# Patient Record
Sex: FEMALE | Race: WHITE | NOT HISPANIC OR LATINO | Employment: PART TIME | URBAN - NONMETROPOLITAN AREA
[De-identification: names, ages, dates, MRNs, and addresses within clinical notes are randomized per-mention and may not be internally consistent; named-entity substitution may affect disease eponyms.]

---
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
## 2023-02-04 ENCOUNTER — HOSPITAL ENCOUNTER (EMERGENCY)
Facility: HOSPITAL | Age: 18
Discharge: HOME | End: 2023-02-04
Attending: EMERGENCY MEDICINE

## 2023-02-04 VITALS
SYSTOLIC BLOOD PRESSURE: 141 MMHG | RESPIRATION RATE: 18 BRPM | TEMPERATURE: 97.7 F | BODY MASS INDEX: 34.17 KG/M2 | HEART RATE: 112 BPM | DIASTOLIC BLOOD PRESSURE: 85 MMHG | OXYGEN SATURATION: 99 % | WEIGHT: 199.08 LBS

## 2023-02-04 DIAGNOSIS — N93.9 ABNORMAL UTERINE BLEEDING: ICD-10-CM

## 2023-02-04 DIAGNOSIS — N93.9 VAGINAL BLEEDING: Primary | ICD-10-CM

## 2023-02-04 LAB
ABO GROUP BLD: NORMAL
ABO GROUP BLD: NORMAL
ALBUMIN SERPL BCP-MCNC: 4.4 G/DL (ref 3.5–5)
ALP SERPL-CCNC: 63 U/L (ref 34–104)
ALT SERPL W P-5'-P-CCNC: 148 U/L (ref 7–52)
ANION GAP SERPL CALCULATED.3IONS-SCNC: 11 MMOL/L (ref 4–13)
AST SERPL W P-5'-P-CCNC: 76 U/L (ref 13–39)
B-HCG SERPL-ACNC: <1 MIU/ML (ref 0–11.6)
BASOPHILS # BLD AUTO: 0.05 THOUSANDS/ÂΜL (ref 0–0.1)
BASOPHILS NFR BLD AUTO: 1 % (ref 0–1)
BILIRUB SERPL-MCNC: 0.4 MG/DL (ref 0.2–1)
BLD GP AB SCN SERPL QL: NEGATIVE
BUN SERPL-MCNC: 9 MG/DL (ref 5–25)
CALCIUM SERPL-MCNC: 9.1 MG/DL (ref 8.4–10.2)
CHLORIDE SERPL-SCNC: 102 MMOL/L (ref 96–108)
CO2 SERPL-SCNC: 26 MMOL/L (ref 21–32)
CREAT SERPL-MCNC: 0.7 MG/DL (ref 0.6–1.3)
EOSINOPHIL # BLD AUTO: 0.31 THOUSAND/ÂΜL (ref 0–0.61)
EOSINOPHIL NFR BLD AUTO: 4 % (ref 0–6)
ERYTHROCYTE [DISTWIDTH] IN BLOOD BY AUTOMATED COUNT: 12.4 % (ref 11.6–15.1)
EXT PREGNANCY TEST URINE: NEGATIVE
EXT. CONTROL: NORMAL
GFR SERPL CREATININE-BSD FRML MDRD: 126 ML/MIN/1.73SQ M
GLUCOSE SERPL-MCNC: 167 MG/DL (ref 65–140)
HCT VFR BLD AUTO: 41.8 % (ref 34.8–46.1)
HGB BLD-MCNC: 14.3 G/DL (ref 11.5–15.4)
IMM GRANULOCYTES # BLD AUTO: 0.05 THOUSAND/UL (ref 0–0.2)
IMM GRANULOCYTES NFR BLD AUTO: 1 % (ref 0–2)
LYMPHOCYTES # BLD AUTO: 2.81 THOUSANDS/ÂΜL (ref 0.6–4.47)
LYMPHOCYTES NFR BLD AUTO: 38 % (ref 14–44)
MCH RBC QN AUTO: 29.2 PG (ref 26.8–34.3)
MCHC RBC AUTO-ENTMCNC: 34.2 G/DL (ref 31.4–37.4)
MCV RBC AUTO: 85 FL (ref 82–98)
MONOCYTES # BLD AUTO: 0.46 THOUSAND/ÂΜL (ref 0.17–1.22)
MONOCYTES NFR BLD AUTO: 6 % (ref 4–12)
NEUTROPHILS # BLD AUTO: 3.78 THOUSANDS/ÂΜL (ref 1.85–7.62)
NEUTS SEG NFR BLD AUTO: 50 % (ref 43–75)
NRBC BLD AUTO-RTO: 0 /100 WBCS
PLATELET # BLD AUTO: 288 THOUSANDS/UL (ref 149–390)
PMV BLD AUTO: 9 FL (ref 8.9–12.7)
POTASSIUM SERPL-SCNC: 3.6 MMOL/L (ref 3.5–5.3)
PROT SERPL-MCNC: 7 G/DL (ref 6.4–8.4)
RBC # BLD AUTO: 4.9 MILLION/UL (ref 3.81–5.12)
RH BLD: POSITIVE
RH BLD: POSITIVE
SODIUM SERPL-SCNC: 139 MMOL/L (ref 135–147)
SPECIMEN EXPIRATION DATE: NORMAL
WBC # BLD AUTO: 7.46 THOUSAND/UL (ref 4.31–10.16)

## 2023-02-04 NOTE — ED PROVIDER NOTES
History  Chief Complaint   Patient presents with   • Vaginal Bleeding     Bleeding for 2 weeks heavy  Lightheaded also     21year old female presents for vaginal bleeding  No abdominal pain or cramping  Bleeding described as heavy stating that she is soaking through extra absorbent tampons and pads  Patient noticed large clots as well being passed  Patient states that she does feel lightheaded from time to time  She has been eating and drinking without difficulty, although she only has 1 meal per day  Per [facility] OB[facility]GYN note from 1/25, diagnosed with Irregular menses (Primary) and Dysmenorrhea  Only therapy recommendation in the record is for symptom management with ibuprofen  Patient stopped taking OCPs in August 2022  Currently trying to get pregnant  On exam, the patient is very well-appearing, no distress  Noted that she ambulated to the exam room without any difficulty  None       Past Medical History:   Diagnosis Date   • Tonsillitis        Past Surgical History:   Procedure Laterality Date   • TONSILLECTOMY  03/16/2022   • TYMPANOSTOMY TUBE PLACEMENT         History reviewed  No pertinent family history  I have reviewed and agree with the history as documented  E-Cigarette/Vaping   • E-Cigarette Use Never User      E-Cigarette/Vaping Substances     Social History     Tobacco Use   • Smoking status: Never   • Smokeless tobacco: Never   Vaping Use   • Vaping Use: Never used   Substance Use Topics   • Alcohol use: Not Currently   • Drug use: Never       Review of Systems   Constitutional: Negative for activity change, appetite change, chills, diaphoresis, fatigue and fever  HENT: Negative for dental problem, ear pain, sore throat, trouble swallowing and voice change  Eyes: Negative for pain and visual disturbance  Respiratory: Negative for cough, chest tightness, shortness of breath and wheezing  Cardiovascular: Negative for chest pain, palpitations and leg swelling  Gastrointestinal: Negative for abdominal pain, anal bleeding, blood in stool, diarrhea, nausea, rectal pain and vomiting  Endocrine: Negative for polydipsia, polyphagia and polyuria  Genitourinary: Positive for vaginal bleeding  Negative for difficulty urinating, dysuria, flank pain, frequency, hematuria, pelvic pain, urgency, vaginal discharge and vaginal pain  Musculoskeletal: Negative for back pain, joint swelling, myalgias, neck pain and neck stiffness  Skin: Negative for pallor, rash and wound  Neurological: Negative for dizziness, facial asymmetry, speech difficulty, weakness, light-headedness, numbness and headaches  Hematological: Negative for adenopathy  Psychiatric/Behavioral: Negative for agitation  The patient is not nervous/anxious  All other systems reviewed and are negative  Physical Exam  Physical Exam  Vitals and nursing note reviewed  Constitutional:       General: She is not in acute distress  Appearance: She is well-developed  She is not diaphoretic  HENT:      Head: Normocephalic and atraumatic  Right Ear: External ear normal       Left Ear: External ear normal       Nose: Nose normal  No congestion or rhinorrhea  Mouth/Throat:      Mouth: Mucous membranes are moist    Eyes:      General: No visual field deficit or scleral icterus  Right eye: No discharge  Left eye: No discharge  Conjunctiva/sclera: Conjunctivae normal       Pupils: Pupils are equal, round, and reactive to light  Neck:      Thyroid: No thyromegaly  Vascular: No JVD  Trachea: No tracheal deviation  Cardiovascular:      Rate and Rhythm: Normal rate and regular rhythm  Pulses: Normal pulses  Heart sounds: Normal heart sounds, S1 normal and S2 normal  No murmur heard  No friction rub  No gallop  Pulmonary:      Effort: Pulmonary effort is normal  No accessory muscle usage, respiratory distress or retractions        Breath sounds: Normal breath sounds  No stridor or decreased air movement  No decreased breath sounds, wheezing, rhonchi or rales  Chest:      Chest wall: No tenderness  Abdominal:      General: There is no distension  Palpations: Abdomen is soft  There is no mass  Tenderness: There is no abdominal tenderness  There is no guarding or rebound  Hernia: No hernia is present  Musculoskeletal:         General: No tenderness or deformity  Normal range of motion  Cervical back: Normal range of motion and neck supple  Right lower leg: No edema  Left lower leg: No edema  Lymphadenopathy:      Cervical: No cervical adenopathy  Skin:     General: Skin is warm and dry  Capillary Refill: Capillary refill takes less than 2 seconds  Coloration: Skin is not mottled or pale  Findings: No bruising, erythema, petechiae or rash  Neurological:      General: No focal deficit present  Mental Status: She is alert and oriented to person, place, and time  GCS: GCS eye subscore is 4  GCS verbal subscore is 5  GCS motor subscore is 6  Cranial Nerves: Cranial nerves 2-12 are intact  No cranial nerve deficit, dysarthria or facial asymmetry  Sensory: Sensation is intact  No sensory deficit  Motor: Motor function is intact  No weakness or tremor  Coordination: Coordination is intact  Gait: Gait is intact  Deep Tendon Reflexes: Reflexes are normal and symmetric   Reflexes normal    Psychiatric:         Behavior: Behavior normal          Vital Signs  ED Triage Vitals [02/04/23 1034]   Temperature Pulse Respirations Blood Pressure SpO2   97 7 °F (36 5 °C) (!) 112 18 141/85 99 %      Temp Source Heart Rate Source Patient Position - Orthostatic VS BP Location FiO2 (%)   Temporal Monitor Sitting Right arm --      Pain Score       2           Vitals:    02/04/23 1034   BP: 141/85   Pulse: (!) 112   Patient Position - Orthostatic VS: Sitting         Visual Acuity      ED Medications  Medications - No data to display    Diagnostic Studies  Results Reviewed     Procedure Component Value Units Date/Time    Quantitative hCG [[id number]]  (Normal) Collected: 02/04/23 1047    Lab Status: Final result Specimen: Blood from Arm, Right Updated: 02/04/23 1117     HCG, Quant <1 mIU/mL     Narrative:       Expected Ranges:     Approximate               Approximate HCG  Gestation age          Concentration ( mIU/mL)  _____________          ______________________   Faye Pimple                      HCG values  0 2-1                       5-50  1-2                           2-3                         100-5000  3-4                         500-10982  4-5                         1000-05075  5-6                         [id number]  6-8                         44827-777484  8-12                        45209-030374      Comprehensive metabolic panel [[id number]]  (Abnormal) Collected: 02/04/23 1047    Lab Status: Final result Specimen: Blood from Arm, Right Updated: 02/04/23 1110     Sodium 139 mmol/L      Potassium 3 6 mmol/L      Chloride 102 mmol/L      CO2 26 mmol/L      ANION GAP 11 mmol/L      BUN 9 mg/dL      Creatinine 0 70 mg/dL      Glucose 167 mg/dL      Calcium 9 1 mg/dL      AST 76 U/L       U/L      Alkaline Phosphatase 63 U/L      Total Protein 7 0 g/dL      Albumin 4 4 g/dL      Total Bilirubin 0 40 mg/dL      eGFR 126 ml/min/1 73sq m     Narrative:      [place] guidelines for Chronic Kidney Disease (CKD):   •  Stage 1 with normal or high GFR (GFR > 90 mL/min/1 73 square meters)  •  Stage 2 Mild CKD (GFR = 60-89 mL/min/1 73 square meters)  •  Stage 3A Moderate CKD (GFR = 45-59 mL/min/1 73 square meters)  •  Stage 3B Moderate CKD (GFR = 30-44 mL/min/1 73 square meters)  •  Stage 4 Severe CKD (GFR = 15-29 mL/min/1 73 square meters)  •  Stage 5 End Stage CKD (GFR <15 mL/min/1 73 square meters)  Note: GFR calculation is accurate only with a steady state creatinine    CBC and differential [[id number]] Collected: 02/04/23 1047    Lab Status: Final result Specimen: Blood from Arm, Right Updated: 02/04/23 1055     WBC 7 46 Thousand/uL      RBC 4 90 Million/uL      Hemoglobin 14 3 g/dL      Hematocrit 41 8 %      MCV 85 fL      MCH 29 2 pg      MCHC 34 2 g/dL      RDW 12 4 %      MPV 9 0 fL      Platelets 652 Thousands/uL      nRBC 0 /100 WBCs      Neutrophils Relative 50 %      Immat GRANS % 1 %      Lymphocytes Relative 38 %      Monocytes Relative 6 %      Eosinophils Relative 4 %      Basophils Relative 1 %      Neutrophils Absolute 3 78 Thousands/µL      Immature Grans Absolute 0 05 Thousand/uL      Lymphocytes Absolute 2 81 Thousands/µL      Monocytes Absolute 0 46 Thousand/µL      Eosinophils Absolute 0 31 Thousand/µL      Basophils Absolute 0 05 Thousands/µL     POCT pregnancy, urine [[id number]]  (Normal) Resulted: 02/04/23 1049    Lab Status: Final result Updated: 02/04/23 1049     EXT Preg Test, Ur Negative     Control Valid                 No orders to display              Procedures  Procedures         ED Course         1133 hrs : Patient reassessed  Vital signs within normal limits- tachycardia resolved, HR 89  Reviewed lab work and plan of action with the patient  Discussed use of hormonal therapy  Patient does not want to consider hormonal therapy at this time as she is trying to get pregnant  Provided patient with gynecology follow-up at [facility] as alternative  Medical Decision Making  21year old female presents for vaginal bleeding  No abdominal pain or cramping  Bleeding described as heavy stating that she is soaking through extra absorbent tampons and pads  Patient noticed large clots as well being passed  Patient states that she does feel lightheaded from time to time  She has been eating and drinking without difficulty, although she only has 1 meal per day    Per [facility] OB/GYN note from 1/25, diagnosed with Irregular menses (Primary) and Dysmenorrhea  Only therapy recommendation in the record is for symptom management with ibuprofen  Patient stopped taking OCPs in August 2022  Currently trying to get pregnant  On exam, the patient is very well-appearing, no distress  Noted that she ambulated to the exam room without any difficulty  Will check basic labs, provide symptom management, pelvic exam, consider blood transfusion as needed, gynecology consult, disposition as appropriate  Amount and/or Complexity of Data Reviewed  External Data Reviewed: labs  Labs: ordered  Care Everywhere Labs History  Expand All  Collapse All  CBC  [facility] [place]  01/25/2023  Component      Hemoglobin   Hematocrit   WBC   RBC   Platelet Count   MPV   MCV   MCH   MCHC   RDW     Component 01/25/2023 03/25/2022 03/24/2022 03/11/2022 01/07/2021 12/08/2020 11/07/2020             Hemoglobin 14 6 High     14 1 14 7 High     14 5 14 3 13 8 13 7   Hematocrit 43 0 40 9 43 4 High     42 3 42 4 41 4 40 1   WBC 6 7 7 8 7 6 6 0 9 6 9 6 9 3   RBC 5 20 High     5 04 High     5 30 High     5 20 High     5 04 High     4 78 4 72   Platelet Count 309 315 325 282 326 304 295   MPV 7 8 6 4 Low     6 5 Low     7 2 Low     7 5 7 9 7 7   MCV 83 81 82 81 84 87 85   MCH 28 2 28 0 27 7 27 9 28 3 28 9 29 0   MCHC 34 0 34 6 33 8 34 3 33 7 33 4 34 2   RDW 13 5 13 8 13 6 14 4            Disposition  Final diagnoses:   Vaginal bleeding   Abnormal uterine bleeding     Time reflects when diagnosis was documented in both MDM as applicable and the Disposition within this note     Time User Action Codes Description Comment    2/4/2023 11:25 AM Shasha Linden Add [N93 9] Vaginal bleeding     2/4/2023 11:25 AM Shasha Linden Add [N93 9] Abnormal uterine bleeding       ED Disposition     ED Disposition   Discharge    Condition   Stable    Date/Time   Sat Feb 4, 2023 11:25 AM    Comment   Dami Owusu discharge to home/self care                 Follow-up Information Follow up With Specialties Details Why Contact Info Additional [address] [facility] OB/GYN Obstetrics and Gynecology Call  Follow up [address] [address]  [address], [address], [address], [address]   [facility] [place] Obstetrics and Gynecology Call  Follow up [address] [address]  [address] [facility], [address] [place] [address], [address]          Patient's Medications    No medications on file       No discharge procedures on file      PDMP Review     None          ED Provider  Electronically Signed by           Shubham Pena,   02/04/23 2025 [place] Naima, DO  02/04/23 1214

## 2023-02-04 NOTE — Clinical Note
Renetta  was seen and treated in our emergency department on 2/4/2023  Diagnosis:     Syl Caldwell  may return to work on return date  She may return on this date: 02/07/2023         If you have any questions or concerns, please don't hesitate to call        Martha Fox DO    ______________________________           _______________          _______________  Hospital Representative                              Date                                Time

## 2023-08-21 ENCOUNTER — OFFICE VISIT (OUTPATIENT)
Dept: URGENT CARE | Facility: CLINIC | Age: 18
End: 2023-08-21
Payer: COMMERCIAL

## 2023-08-21 VITALS
RESPIRATION RATE: 18 BRPM | TEMPERATURE: 100.4 F | BODY MASS INDEX: 35.34 KG/M2 | HEART RATE: 87 BPM | OXYGEN SATURATION: 96 % | SYSTOLIC BLOOD PRESSURE: 129 MMHG | WEIGHT: 207 LBS | HEIGHT: 64 IN | DIASTOLIC BLOOD PRESSURE: 80 MMHG

## 2023-08-21 DIAGNOSIS — R10.2 VAGINAL PAIN: ICD-10-CM

## 2023-08-21 DIAGNOSIS — R09.81 NASAL CONGESTION: ICD-10-CM

## 2023-08-21 DIAGNOSIS — R11.0 NAUSEA: ICD-10-CM

## 2023-08-21 DIAGNOSIS — B34.9 VIRAL SYNDROME: Primary | ICD-10-CM

## 2023-08-21 PROCEDURE — G0382 LEV 3 HOSP TYPE B ED VISIT: HCPCS

## 2023-08-21 PROCEDURE — 99283 EMERGENCY DEPT VISIT LOW MDM: CPT

## 2023-08-21 RX ORDER — VENLAFAXINE HYDROCHLORIDE 75 MG/1
75 CAPSULE, EXTENDED RELEASE ORAL DAILY
COMMUNITY
Start: 2023-08-01 | End: 2024-07-31

## 2023-08-21 RX ORDER — PROPRANOLOL HYDROCHLORIDE 80 MG/1
80 TABLET ORAL 2 TIMES DAILY
COMMUNITY
Start: 2023-07-12

## 2023-08-21 RX ORDER — FLUTICASONE PROPIONATE 50 MCG
2 SPRAY, SUSPENSION (ML) NASAL DAILY
Qty: 11.1 ML | Refills: 0 | Status: SHIPPED | OUTPATIENT
Start: 2023-08-21

## 2023-08-21 RX ORDER — OMEPRAZOLE 40 MG/1
40 CAPSULE, DELAYED RELEASE ORAL DAILY
COMMUNITY
Start: 2023-06-22 | End: 2024-06-21

## 2023-08-21 RX ORDER — QUETIAPINE FUMARATE 50 MG/1
50 TABLET, FILM COATED ORAL EVERY EVENING
COMMUNITY
Start: 2023-08-02

## 2023-08-21 NOTE — PATIENT INSTRUCTIONS
Take prescribed nasal spray as instructed. Tylenol for pain or fever. Make sure to stay well-hydrated and rest.  Daily multivitamin. Zinc.  Vitamin D3 2000 IU daily. Vitamin C 1000 mg twice daily for immune support. May do nasal saline rinses twice daily. Continue with at home previously prescribed Zofran for nausea. Follow-up with your OB/GYN with regards to your vaginal pain. If any symptoms worsen go to the emergency room. If no improvement, follow-up with your family doctor. Follow up with PCP in 3-5 days. Proceed to  ER if symptoms worsen. Acute Nausea and Vomiting   WHAT YOU NEED TO KNOW:   Acute means the nausea and vomiting starts suddenly, gets worse quickly, and lasts a short time. There are many possible causes of acute nausea and vomiting. DISCHARGE INSTRUCTIONS:   Call your local emergency number (915 in the [address]) if:   You have chest pain. You have severe pain or cramping in your abdomen. Your vision is blurred. You are confused, have a high fever, or a stiff neck. You have bright red blood coming from your rectum. Your vomit smells like bowel movement. Return to the emergency department if:   You have a severe headache or pain. You are dizzy, cold, and thirsty, and your eyes and mouth are dry. You are urinating very little or not at all. You are dizzy or lightheaded when you stand up. You see blood or material that looks like coffee grounds in your vomit. Call your doctor if:   You continue to vomit for more than 48 hours. Your nausea and vomiting does not get better or go away after you use medicine. You have questions or concerns about your condition or care. Medicines: You may need any of the following:  Medicines  may be given to calm your stomach and stop your vomiting. You may also need medicines to help empty your stomach and bowels. Take your medicine as directed.   Contact your healthcare provider if you think your medicine is not helping or if you have side effects. Tell your provider if you are allergic to any medicine. Keep a list of the medicines, vitamins, and herbs you take. Include the amounts, and when and why you take them. Bring the list or the pill bottles to follow-up visits. Carry your medicine list with you in case of an emergency. Manage your symptoms:   Rest as much as you can. Too much activity can make your nausea worse. Drink more liquids to prevent dehydration. Take small sips. Try drinks such as ginger ale, lemonade, water, or tea. Your provider may recommend that you drink an oral rehydration solution (ORS). ORS contains water, salts, and sugar that are needed to replace the lost body fluids. Eat smaller meals, more often. Try bland foods and avoid spices or strong flavors    Do not drink alcohol. Alcohol may upset or irritate your stomach. Follow up with your doctor as directed:  Write down your questions so you remember to ask them during your visits. © Copyright [employer] 2022 Information is for End User's use only and may not be sold, redistributed or otherwise used for commercial purposes. The above information is an  only. It is not intended as medical advice for individual conditions or treatments. Talk to your doctor, nurse or pharmacist before following any medical regimen to see if it is safe and effective for you. Viral Syndrome   WHAT YOU NEED TO KNOW:   Viral syndrome is a term used for symptoms of an infection caused by a virus. Viruses are spread easily from person to person on shared items. DISCHARGE INSTRUCTIONS:   Call your local emergency number (911 in the [place]), or have someone call if:   You have a seizure. You cannot be woken. You have chest pain or trouble breathing. Return to the emergency department if:   You have a stiff neck, a bad headache, and sensitivity to light. You feel weak, dizzy, or confused.     You stop urinating or urinate a lot less than usual.    You cough up blood or thick yellow or green mucus. You have severe abdominal pain or your abdomen is larger than usual.    Call your doctor if:   Your symptoms do not get better with treatment or get worse after 3 days. You have a rash or ear pain. You have burning when you urinate. You have questions or concerns about your condition or care. Medicines: You may  need any of the following:  Acetaminophen  decreases pain and fever. It is available without a doctor's order. Ask how much to take and how often to take it. Follow directions. Read the labels of all other medicines you are using to see if they also contain acetaminophen, or ask your doctor or pharmacist. Acetaminophen can cause liver damage if not taken correctly. NSAIDs , such as ibuprofen, help decrease swelling, pain, and fever. NSAIDs can cause stomach bleeding or kidney problems in certain people. If you take blood thinner medicine, always ask your healthcare provider if NSAIDs are safe for you. Always read the medicine label and follow directions. Cold medicine  helps decrease swelling, control a cough, and relieve chest or nasal congestion. Saline nasal spray  helps decrease nasal congestion. Take your medicine as directed. Contact your healthcare provider if you think your medicine is not helping or if you have side effects. Tell your provider if you are allergic to any medicine. Keep a list of the medicines, vitamins, and herbs you take. Include the amounts, and when and why you take them. Bring the list or the pill bottles to follow-up visits. Carry your medicine list with you in case of an emergency. Manage your symptoms:   Drink liquids as directed to prevent dehydration. Ask how much liquid to drink each day and which liquids are best for you. Do not drink liquids with caffeine. Caffeine can make dehydration worse. Get plenty of rest to help your body heal.  Take naps throughout the day.  Ask your healthcare provider when you can return to work and your normal activities. Use a cool mist humidifier  to increase air moisture in your home. This may make it easier for you to breathe and help decrease your cough. Drink tea with honey or use cough drops for a sore throat. Cough drops are available without a doctor's order. Follow directions for taking cough drops. Do not smoke or be close to anyone who is smoking. Nicotine and other chemicals in cigarettes and cigars can cause lung damage. Smoking can also delay healing. Ask your healthcare provider for information if you currently smoke and need help to quit. E-cigarettes or smokeless tobacco still contain nicotine. Talk to your healthcare provider before you use these products. Prevent the spread of germs:   Wash your hands often throughout the day. Use soap and water. Rub your soapy hands together, lacing your fingers, for at least 20 seconds. Rinse with warm, running water. Dry your hands with a clean towel or paper towel. Use hand  that contains alcohol if soap and water are not available. Teach children how to wash their hands and use hand . Cover sneezes and coughs. Turn your face away and cover your mouth and nose with a tissue. Throw the tissue away. Use the bend of your arm if a tissue is not available. Then wash your hands well with soap and water or use hand . Teach children how to cover a cough or sneeze. Stay home while you are sick. Avoid crowds as much as possible. Get the influenza (flu) vaccine as soon as recommended each year. The flu vaccine is available starting in September or October. Ask your healthcare provider about the pneumonia vaccine. This vaccine is usually recommended every 5 years in older adults. Follow up with your doctor as directed:  Write down your questions so you remember to ask them during your visits.   © Copyright [employer] 2022 Information is for End User's use only and may not be sold, redistributed or otherwise used for commercial purposes. The above information is an  only. It is not intended as medical advice for individual conditions or treatments. Talk to your doctor, nurse or pharmacist before following any medical regimen to see if it is safe and effective for you.

## 2023-08-21 NOTE — PROGRESS NOTES
[facility]        NAME: Brian Escamilla is a 19 y.o. female  : 2005    MRN: [id number]  DATE: 2023  TIME: 5:45 PM    Assessment and Plan   Viral syndrome [B34.9]  1. Viral syndrome        2. Vaginal pain        3. Nasal congestion  fluticasone (FLONASE) 50 mcg/act nasal spray      4. Nausea          Symptoms started today-nausea without vomiting, headache, body aches, sore throat, fever, vaginal pain. Denies chance of pregnancy-negative test today at home. Negative COVID test at home today. Slight temperature of 100.4 F. Vaginal pain started today as well, history of PCOS. No discharge or urinary symptoms. Recent follow-up with OB/GYN a week ago. Advised to follow-up with OB/GYN for current symptoms. Advised to go to the ER symptoms worsen. Advised follow-up with family doctor. Patient Instructions     Take prescribed nasal spray as instructed. Tylenol for pain or fever. Make sure to stay well-hydrated and rest.  Daily multivitamin. Zinc.  Vitamin D3 2000 IU daily. Vitamin C 1000 mg twice daily for immune support. May do nasal saline rinses twice daily. Continue with at home previously prescribed Zofran for nausea. Follow-up with your OB/GYN with regards to your vaginal pain. If any symptoms worsen go to the emergency room. If no improvement, follow-up with your family doctor. Follow up with PCP in 3-5 days. Proceed to  ER if symptoms worsen. Chief Complaint     Chief Complaint   Patient presents with   • Headache   • Nausea   • Generalized Body Aches   • Sore Throat   • Fever     Did take an at home covid and was neg. Fever is 100.4 Symptoms started this morning. 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 She did take a pregnacy test was negative. Vaginal pain also just started this morning.     • Vaginal Pain         History of Present Illness       64-year-old female here with significant other for nausea, headache, body aches, sore throat, fever, and vaginal pain that began today. PT had a negative home COVID test and negative home pregnancy test today. PT states that her and her partner are actively trying to have a child. PT has Zofran at home that was previously prescribed, has not taken it today. Admits to runny nose/congestion. Has not tried any over-the-counter medications today. PT states that she has history of PCOS-denies any recent UTI symptoms, discharge, bleeding. Last normal menstrual period was about 3 weeks ago per patient. Denies any chills, earache, chest pain, short breathing, abdominal pain, vomiting, diarrhea, constipation. Review of Systems   Review of Systems   Constitutional: Positive for fever. HENT: Positive for congestion, rhinorrhea and sore throat. Negative for ear discharge and ear pain. Eyes: Negative. Respiratory: Negative. Cardiovascular: Negative. Gastrointestinal: Positive for nausea. Negative for abdominal distention, abdominal pain, blood in stool, constipation, diarrhea, rectal pain and vomiting. Genitourinary: Positive for vaginal pain. Negative for decreased urine volume, difficulty urinating, flank pain, frequency, hematuria, urgency, vaginal bleeding and vaginal discharge. Musculoskeletal: Positive for myalgias. Skin: Negative. Neurological: Negative.           Current Medications       Current Outpatient Medications:   •  fluticasone (FLONASE) 50 mcg/act nasal spray, 2 sprays into each nostril daily, Disp: 11.1 mL, Rfl: 0  •  metFORMIN (GLUCOPHAGE) 500 mg tablet, Take 500 mg by mouth 2 (two) times a day with meals, Disp: , Rfl:   •  omeprazole (PriLOSEC) 40 MG capsule, Take 40 mg by mouth daily, Disp: , Rfl:   •  propranolol (INDERAL) 80 mg tablet, Take 80 mg by mouth 2 (two) times a day, Disp: , Rfl:   •  QUEtiapine (SEROquel) 50 mg tablet, Take 50 mg by mouth every evening, Disp: , Rfl:   •  venlafaxine (EFFEXOR-XR) 75 mg 24 hr capsule, Take 75 mg by mouth daily, Disp: , Rfl: Current Allergies     Allergies as of 08/21/2023   • (No Known Allergies)            The following portions of the patient's history were reviewed and updated as appropriate: allergies, current medications, past family history, past medical history, past social history, past surgical history and problem list.     Past Medical History:   Diagnosis Date   • Diabetes mellitus ([address])    • Tonsillitis        Past Surgical History:   Procedure Laterality Date   • TONSILLECTOMY  03/16/2022   • TYMPANOSTOMY TUBE PLACEMENT         History reviewed. No pertinent family history. Medications have been verified. Objective   /80   Pulse 87   Temp 100.4 °F (38 °C)   Resp 18   Ht 5' 4" (1.626 m)   Wt 93.9 kg (207 lb)   SpO2 96%   BMI 35.53 kg/m²        Physical Exam     Physical Exam  Constitutional:       General: She is not in acute distress. Appearance: Normal appearance. She is not ill-appearing. HENT:      Head: Normocephalic and atraumatic. Right Ear: Tympanic membrane, ear canal and external ear normal.      Left Ear: Tympanic membrane, ear canal and external ear normal.      Nose: Congestion and rhinorrhea present. Mouth/Throat:      Lips: Pink. Mouth: Mucous membranes are moist.      Pharynx: Oropharynx is clear. Uvula midline. No pharyngeal swelling, oropharyngeal exudate, posterior oropharyngeal erythema or uvula swelling. Tonsils: No tonsillar exudate or tonsillar abscesses. Eyes:      Extraocular Movements: Extraocular movements intact. Conjunctiva/sclera: Conjunctivae normal.      Pupils: Pupils are equal, round, and reactive to light. Cardiovascular:      Rate and Rhythm: Normal rate and regular rhythm. Pulses: Normal pulses. Heart sounds: Normal heart sounds. No murmur heard. No friction rub. No gallop. Pulmonary:      Effort: Pulmonary effort is normal. No respiratory distress. Breath sounds: Normal breath sounds. No stridor.  No wheezing, rhonchi or rales. Musculoskeletal:      Cervical back: Normal range of motion. Lymphadenopathy:      Cervical: No cervical adenopathy. Skin:     General: Skin is warm and dry. Capillary Refill: Capillary refill takes less than 2 seconds. Findings: No rash. Neurological:      General: No focal deficit present. Mental Status: She is alert and oriented to person, place, and time. Mental status is at baseline.    Psychiatric:         Mood and Affect: Mood normal.         Behavior: Behavior normal.

## 2023-08-21 NOTE — LETTER
August 21, 2023     Patient: Monik Lockwood   YOB: 2005   Date of Visit: 8/21/2023       To Whom it May Concern:    Monik Lockwood was seen in my clinic on 8/21/2023. She may return to work when symptoms have improved and fever free for 24 hours without taking fever reducing medications. If you have any questions or concerns, please don't hesitate to call.          Sincerely,          Corinne Bench, PA-C        CC: No Recipients

## 2023-08-23 ENCOUNTER — APPOINTMENT (EMERGENCY)
Dept: CT IMAGING | Facility: HOSPITAL | Age: 18
End: 2023-08-23
Payer: COMMERCIAL

## 2023-08-23 ENCOUNTER — HOSPITAL ENCOUNTER (EMERGENCY)
Facility: HOSPITAL | Age: 18
Discharge: HOME | End: 2023-08-24
Attending: EMERGENCY MEDICINE
Payer: COMMERCIAL

## 2023-08-23 VITALS
SYSTOLIC BLOOD PRESSURE: 123 MMHG | RESPIRATION RATE: 18 BRPM | HEART RATE: 87 BPM | DIASTOLIC BLOOD PRESSURE: 67 MMHG | OXYGEN SATURATION: 97 % | TEMPERATURE: 96.8 F

## 2023-08-23 DIAGNOSIS — R10.9 ABDOMINAL PAIN: Primary | ICD-10-CM

## 2023-08-23 PROBLEM — E28.2 PCOS (POLYCYSTIC OVARIAN SYNDROME): Status: ACTIVE | Noted: 2023-08-23

## 2023-08-23 LAB
ALBUMIN SERPL BCP-MCNC: 4.1 G/DL (ref 3.5–5)
ALP SERPL-CCNC: 89 U/L (ref 34–104)
ALT SERPL W P-5'-P-CCNC: 55 U/L (ref 7–52)
ANION GAP SERPL CALCULATED.3IONS-SCNC: 10 MMOL/L
AST SERPL W P-5'-P-CCNC: 43 U/L (ref 13–39)
BACTERIA UR QL AUTO: ABNORMAL /HPF
BILIRUB SERPL-MCNC: 0.27 MG/DL (ref 0.2–1)
BILIRUB UR QL STRIP: ABNORMAL
BUN SERPL-MCNC: 8 MG/DL (ref 5–25)
CALCIUM SERPL-MCNC: 9 MG/DL (ref 8.4–10.2)
CHLORIDE SERPL-SCNC: 105 MMOL/L (ref 96–108)
CLARITY UR: CLEAR
CO2 SERPL-SCNC: 24 MMOL/L (ref 21–32)
COLOR UR: YELLOW
CREAT SERPL-MCNC: 0.79 MG/DL (ref 0.6–1.3)
ERYTHROCYTE [DISTWIDTH] IN BLOOD BY AUTOMATED COUNT: 16 % (ref 11.6–15.1)
EXT PREGNANCY TEST URINE: NEGATIVE
EXT. CONTROL: NORMAL
GFR SERPL CREATININE-BSD FRML MDRD: 108 ML/MIN/1.73SQ M
GLUCOSE SERPL-MCNC: 111 MG/DL (ref 65–140)
GLUCOSE UR STRIP-MCNC: NEGATIVE MG/DL
HCT VFR BLD AUTO: 36.1 % (ref 34.8–46.1)
HGB BLD-MCNC: 11.3 G/DL (ref 11.5–15.4)
HGB UR QL STRIP.AUTO: NEGATIVE
KETONES UR STRIP-MCNC: ABNORMAL MG/DL
LEUKOCYTE ESTERASE UR QL STRIP: ABNORMAL
LIPASE SERPL-CCNC: 68 U/L (ref 11–82)
MCH RBC QN AUTO: 23.7 PG (ref 26.8–34.3)
MCHC RBC AUTO-ENTMCNC: 31.3 G/DL (ref 31.4–37.4)
MCV RBC AUTO: 76 FL (ref 82–98)
NITRITE UR QL STRIP: NEGATIVE
NON-SQ EPI CELLS URNS QL MICRO: ABNORMAL /HPF
PH UR STRIP.AUTO: 5.5 [PH]
PLATELET # BLD AUTO: 195 THOUSANDS/UL (ref 149–390)
PMV BLD AUTO: 9.4 FL (ref 8.9–12.7)
POTASSIUM SERPL-SCNC: 3.5 MMOL/L (ref 3.5–5.3)
PROT SERPL-MCNC: 6.6 G/DL (ref 6.4–8.4)
PROT UR STRIP-MCNC: ABNORMAL MG/DL
RBC # BLD AUTO: 4.77 MILLION/UL (ref 3.81–5.12)
RBC #/AREA URNS AUTO: ABNORMAL /HPF
SODIUM SERPL-SCNC: 139 MMOL/L (ref 135–147)
SP GR UR STRIP.AUTO: 1.02 (ref 1–1.03)
UROBILINOGEN UR QL STRIP.AUTO: 1 E.U./DL
WBC # BLD AUTO: 5.3 THOUSAND/UL (ref 4.31–10.16)
WBC #/AREA URNS AUTO: ABNORMAL /HPF

## 2023-08-23 PROCEDURE — 96361 HYDRATE IV INFUSION ADD-ON: CPT

## 2023-08-23 PROCEDURE — 81025 URINE PREGNANCY TEST: CPT | Performed by: EMERGENCY MEDICINE

## 2023-08-23 PROCEDURE — 36415 COLL VENOUS BLD VENIPUNCTURE: CPT | Performed by: EMERGENCY MEDICINE

## 2023-08-23 PROCEDURE — 81001 URINALYSIS AUTO W/SCOPE: CPT | Performed by: EMERGENCY MEDICINE

## 2023-08-23 PROCEDURE — 74177 CT ABD & PELVIS W/CONTRAST: CPT

## 2023-08-23 PROCEDURE — 83690 ASSAY OF LIPASE: CPT | Performed by: EMERGENCY MEDICINE

## 2023-08-23 PROCEDURE — 85025 COMPLETE CBC W/AUTO DIFF WBC: CPT | Performed by: EMERGENCY MEDICINE

## 2023-08-23 PROCEDURE — 99284 EMERGENCY DEPT VISIT MOD MDM: CPT

## 2023-08-23 PROCEDURE — 96374 THER/PROPH/DIAG INJ IV PUSH: CPT

## 2023-08-23 PROCEDURE — 80053 COMPREHEN METABOLIC PANEL: CPT | Performed by: EMERGENCY MEDICINE

## 2023-08-23 PROCEDURE — 96375 TX/PRO/DX INJ NEW DRUG ADDON: CPT

## 2023-08-23 PROCEDURE — G1004 CDSM NDSC: HCPCS

## 2023-08-23 RX ORDER — KETOROLAC TROMETHAMINE 30 MG/ML
15 INJECTION, SOLUTION INTRAMUSCULAR; INTRAVENOUS ONCE
Status: COMPLETED | OUTPATIENT
Start: 2023-08-23 | End: 2023-08-23

## 2023-08-23 RX ORDER — HYDROMORPHONE HCL/PF 1 MG/ML
0.5 SYRINGE (ML) INJECTION ONCE
Status: COMPLETED | OUTPATIENT
Start: 2023-08-23 | End: 2023-08-23

## 2023-08-23 RX ORDER — ONDANSETRON 2 MG/ML
4 INJECTION INTRAMUSCULAR; INTRAVENOUS ONCE
Status: COMPLETED | OUTPATIENT
Start: 2023-08-23 | End: 2023-08-23

## 2023-08-23 RX ADMIN — SODIUM CHLORIDE 1000 ML: 0.9 INJECTION, SOLUTION INTRAVENOUS at 22:20

## 2023-08-23 RX ADMIN — HYDROMORPHONE HYDROCHLORIDE 0.5 MG: 1 INJECTION, SOLUTION INTRAMUSCULAR; INTRAVENOUS; SUBCUTANEOUS at 23:47

## 2023-08-23 RX ADMIN — ONDANSETRON 4 MG: 2 INJECTION INTRAMUSCULAR; INTRAVENOUS at 22:20

## 2023-08-23 RX ADMIN — KETOROLAC TROMETHAMINE 15 MG: 30 INJECTION, SOLUTION INTRAMUSCULAR; INTRAVENOUS at 22:39

## 2023-08-23 RX ADMIN — IOHEXOL 100 ML: 350 INJECTION, SOLUTION INTRAVENOUS at 22:48

## 2023-08-23 NOTE — Clinical Note
Jason Eric was seen and treated in our emergency department on 8/23/2023. Diagnosis: Abdominal pain    Kathy Collins  may return to work on return date. She may return on this date: 08/25/2023         If you have any questions or concerns, please don't hesitate to call.       Barbraa Pritchett, DO    ______________________________           _______________          _______________  Hospital Representative                              Date                                Time

## 2023-08-24 PROCEDURE — 99285 EMERGENCY DEPT VISIT HI MDM: CPT | Performed by: EMERGENCY MEDICINE

## 2023-08-24 RX ORDER — ONDANSETRON 4 MG/1
4 TABLET, ORALLY DISINTEGRATING ORAL EVERY 6 HOURS PRN
Qty: 20 TABLET | Refills: 0 | Status: SHIPPED | OUTPATIENT
Start: 2023-08-24

## 2023-08-24 RX ORDER — DICYCLOMINE HCL 20 MG
20 TABLET ORAL EVERY 8 HOURS PRN
Qty: 20 TABLET | Refills: 0 | Status: SHIPPED | OUTPATIENT
Start: 2023-08-24

## 2023-08-24 NOTE — ED PROVIDER NOTES
History  Chief Complaint   Patient presents with   • Abdominal Cramping     Pelvic cramps and pt states "cramping in my vagina" Last menstrual period 2.5 weeks ago     17-year-old female presents for evaluation of abdominal pain. Patient reports lower pelvic cramping as well as pain radiating around her abdomen. She reports nausea at home, chronic diarrhea since starting metformin for PCOS. She denies fevers or chills. Last menstrual cycle was 2-1/2 weeks ago although she is attempting to conceive. Denies dysuria, bloody or dark stools. Does not recall having this pain previously. She does recall recent URI at the beginning of the week. patient reports taking medication at home without relief of her symptoms. Prior to Admission Medications   Prescriptions Last Dose Informant Patient Reported? Taking? QUEtiapine (SEROquel) 50 mg tablet   Yes No   Sig: Take 50 mg by mouth every evening   fluticasone (FLONASE) 50 mcg/act nasal spray   No No   Si sprays into each nostril daily   metFORMIN (GLUCOPHAGE) 500 mg tablet   Yes No   Sig: Take 500 mg by mouth 2 (two) times a day with meals   omeprazole (PriLOSEC) 40 MG capsule   Yes No   Sig: Take 40 mg by mouth daily   propranolol (INDERAL) 80 mg tablet   Yes No   Sig: Take 80 mg by mouth 2 (two) times a day   venlafaxine (EFFEXOR-XR) 75 mg 24 hr capsule   Yes No   Sig: Take 75 mg by mouth daily      Facility-Administered Medications: None       Past Medical History:   Diagnosis Date   • Diabetes mellitus ([address])    • PCOS (polycystic ovarian syndrome)    • Tonsillitis        Past Surgical History:   Procedure Laterality Date   • TONSILLECTOMY  2022   • TYMPANOSTOMY TUBE PLACEMENT         History reviewed. No pertinent family history. I have reviewed and agree with the history as documented.     E-Cigarette/Vaping   • E-Cigarette Use Current Every Day User      E-Cigarette/Vaping Substances   • Nicotine Yes      Social History     Tobacco Use   • Smoking status: Never   • Smokeless tobacco: Never   Vaping Use   • Vaping Use: Every day   • Substances: Nicotine   Substance Use Topics   • Alcohol use: Not Currently   • Drug use: Never       Review of Systems   Constitutional: Negative for appetite change, chills and fever. Gastrointestinal: Positive for abdominal pain, diarrhea and nausea. Negative for constipation and vomiting. Genitourinary: Negative for dysuria, menstrual problem, vaginal bleeding and vaginal discharge. All other systems reviewed and are negative. Physical Exam  Physical Exam  Vitals reviewed. Constitutional:       General: She is not in acute distress. Appearance: Normal appearance. She is not ill-appearing, toxic-appearing or diaphoretic. HENT:      Head: Normocephalic and atraumatic. Right Ear: External ear normal.      Left Ear: External ear normal.   Eyes:      General:         Right eye: No discharge. Left eye: No discharge. Extraocular Movements: Extraocular movements intact. Cardiovascular:      Rate and Rhythm: Normal rate and regular rhythm. Pulmonary:      Effort: Pulmonary effort is normal. No respiratory distress. Abdominal:      General: There is no distension. Palpations: Abdomen is soft. Tenderness: There is abdominal tenderness (Generalized). There is no right CVA tenderness, left CVA tenderness, guarding or rebound. Musculoskeletal:         General: No deformity or signs of injury. Right lower leg: No edema. Left lower leg: No edema. Skin:     General: Skin is warm. Coloration: Skin is not jaundiced or pale. Neurological:      General: No focal deficit present. Mental Status: She is alert.       Gait: Gait normal.         Vital Signs  ED Triage Vitals [08/23/23 2121]   Temperature Pulse Respirations Blood Pressure SpO2   (!) 96.8 °F (36 °C) 86 18 134/72 98 %      Temp Source Heart Rate Source Patient Position - Orthostatic VS BP Location FiO2 (%) Temporal Monitor Lying Left arm --      Pain Score       --           Vitals:    08/23/23 2121 08/23/23 2349   BP: 134/72 123/67   Pulse: 86 87   Patient Position - Orthostatic VS: Lying Lying         Visual Acuity      ED Medications  Medications   sodium chloride 0.9 % bolus 1,000 mL (0 mL Intravenous Stopped 8/23/23 2320)   ondansetron (ZOFRAN) injection 4 mg (4 mg Intravenous Given 8/23/23 2220)   ketorolac (TORADOL) injection 15 mg (15 mg Intravenous Given 8/23/23 2239)   iohexol (OMNIPAQUE) 350 MG/ML injection (SINGLE-DOSE) 100 mL (100 mL Intravenous Given 8/23/23 2248)   HYDROmorphone (DILAUDID) injection 0.5 mg (0.5 mg Intravenous Given 8/23/23 2347)       Diagnostic Studies  Results Reviewed     Procedure Component Value Units Date/Time    CBC and differential [[id number]]  (Abnormal) Collected: 08/23/23 2220    Lab Status: Final result Specimen: Blood from Hand, Left Updated: 08/23/23 2313     WBC 5.30 Thousand/uL      RBC 4.77 Million/uL      Hemoglobin 11.3 g/dL      Hematocrit 36.1 %      MCV 76 fL      MCH 23.7 pg      MCHC 31.3 g/dL      RDW 16.0 %      MPV 9.4 fL      Platelets 640 Thousands/uL     Narrative: This is an appended report. These results have been appended to a previously verified report.     Urine Microscopic [[id number]]  (Abnormal) Collected: 08/23/23 2238    Lab Status: Final result Specimen: Urine, Clean Catch Updated: 08/23/23 2300     RBC, UA 1-2 /hpf      WBC, UA 1-2 /hpf      Epithelial Cells Moderate /hpf      Bacteria, UA Moderate /hpf     UA w Reflex to Microscopic w Reflex to Culture [[id number]]  (Abnormal) Collected: 08/23/23 2238    Lab Status: Final result Specimen: Urine, Clean Catch Updated: 08/23/23 2252     Color, UA Yellow     Clarity, UA Clear     Specific Gravity, UA 1.025     pH, UA 5.5     Leukocytes, UA Trace     Nitrite, UA Negative     Protein, UA Trace mg/dl      Glucose, UA Negative mg/dl      Ketones, UA Trace mg/dl      Urobilinogen, UA 1.0 E.U./dl Bilirubin, UA Small     Occult Blood, UA Negative    Comprehensive metabolic panel [[id number]]  (Abnormal) Collected: 08/23/23 2220    Lab Status: Final result Specimen: Blood from Hand, Left Updated: 08/23/23 2246     Sodium 139 mmol/L      Potassium 3.5 mmol/L      Chloride 105 mmol/L      CO2 24 mmol/L      ANION GAP 10 mmol/L      BUN 8 mg/dL      Creatinine 0.79 mg/dL      Glucose 111 mg/dL      Calcium 9.0 mg/dL      AST 43 U/L      ALT 55 U/L      Alkaline Phosphatase 89 U/L      Total Protein 6.6 g/dL      Albumin 4.1 g/dL      Total Bilirubin 0.27 mg/dL      eGFR 108 ml/min/1.73sq m     Narrative:      [place] guidelines for Chronic Kidney Disease (CKD):   •  Stage 1 with normal or high GFR (GFR > 90 mL/min/1.73 square meters)  •  Stage 2 Mild CKD (GFR = 60-89 mL/min/1.73 square meters)  •  Stage 3A Moderate CKD (GFR = 45-59 mL/min/1.73 square meters)  •  Stage 3B Moderate CKD (GFR = 30-44 mL/min/1.73 square meters)  •  Stage 4 Severe CKD (GFR = 15-29 mL/min/1.73 square meters)  •  Stage 5 End Stage CKD (GFR <15 mL/min/1.73 square meters)  Note: GFR calculation is accurate only with a steady state creatinine    Lipase [[id number]]  (Normal) Collected: 08/23/23 2220    Lab Status: Final result Specimen: Blood from Hand, Left Updated: 08/23/23 2246     Lipase 68 u/L     POCT pregnancy, urine [[id number]]  (Normal) Resulted: 08/23/23 2238    Lab Status: Final result Updated: 08/23/23 2239     EXT Preg Test, Ur Negative     Control Valid                 CT abdomen pelvis with contrast   Final Result by Anjana Navarro MD (08/24 0017)      No acute intra-abdominal abnormality. Trace pelvic free fluid likely physiologic in nature.             Workstation performed: [id number]                    Procedures  Procedures         ED Course  ED Course as of 08/24/23 1036   Wed Aug 23, 2023   2241 PREGNANCY TEST URINE: Negative   2347 Urine Microscopic(!)  Not overwhelming for UTI given moderate epithelial, no significant WBC   2348 Lipase: 68  negative   2348 Comprehensive metabolic panel(!)  Previously with LFT elevations   Thu Aug 24, 2023   0028 CT abdomen pelvis with contrast  IMPRESSION:     No acute intra-abdominal abnormality.     Trace pelvic free fluid likely physiologic in nature. 0047 Feels improved, we discussed results including LFT, CT imaging. Little concern at this time for torsion however patient is to RTED for worsened symptoms. Will rx zofran, bentyl. Requesting work note. Medical Decision Making  22-year-old female presents for evaluation of abdominal pain. Will obtain a urine pregnancy to rule out pregnancy or ectopic pregnancy, patient does have a history of PCOS however doubt ovarian pathology given description of symptoms. We will however evaluate for other etiologies including biliary disease, pancreatitis, gastritis, colitis, diverticulitis, nephrolithiasis, UTI. Patient does report a URI a few days ago, is possible she has colitis from this viral source. Amount and/or Complexity of Data Reviewed  Labs: ordered. Decision-making details documented in ED Course. Radiology: ordered. Decision-making details documented in ED Course. Risk  Prescription drug management. Disposition  Final diagnoses:   Abdominal pain     Time reflects when diagnosis was documented in both MDM as applicable and the Disposition within this note     Time User Action Codes Description Comment    8/24/2023 12:46 AM Reilly Leung Add [R10.9] Abdominal pain       ED Disposition     ED Disposition   Discharge    Condition   Stable    Date/Time   Thu Aug 24, 2023 12:46 AM    Comment   Bartolo Hals discharge to home/self care.                Follow-up Information     Follow up With Specialties Details Why Contact Info Additional [address],  Family Medicine Call   [address] [facility] Emergency Department Emergency Medicine  If symptoms worsen [address] [facility] [id number] 283 [facility] Emergency Department, [address] [facility], [place], [place], [address]          Discharge Medication List as of 8/24/2023 12:47 AM      START taking these medications    Details   dicyclomine (BENTYL) 20 mg tablet Take 1 tablet (20 mg total) by mouth every 8 (eight) hours as needed (Abdominal cramping), Starting Thu 8/24/2023, Normal      ondansetron (ZOFRAN-ODT) 4 mg disintegrating tablet Take 1 tablet (4 mg total) by mouth every 6 (six) hours as needed for nausea or vomiting, Starting Thu 8/24/2023, Normal         CONTINUE these medications which have NOT CHANGED    Details   fluticasone (FLONASE) 50 mcg/act nasal spray 2 sprays into each nostril daily, Starting Mon 8/21/2023, Normal      metFORMIN (GLUCOPHAGE) 500 mg tablet Take 500 mg by mouth 2 (two) times a day with meals, Starting Thu 7/20/2023, Historical Med      omeprazole (PriLOSEC) 40 MG capsule Take 40 mg by mouth daily, Starting Thu 6/22/2023, Until Fri 6/21/2024, Historical Med      propranolol (INDERAL) 80 mg tablet Take 80 mg by mouth 2 (two) times a day, Starting Wed 7/12/2023, Historical Med      QUEtiapine (SEROquel) 50 mg tablet Take 50 mg by mouth every evening, Starting Wed 8/2/2023, Historical Med      venlafaxine (EFFEXOR-XR) 75 mg 24 hr capsule Take 75 mg by mouth daily, Starting Tue 8/1/2023, Until Wed 7/31/2024, Historical Med             No discharge procedures on file.     PDMP Review     None          ED Provider  Electronically Signed by           Elmer Newman DO  08/24/23 2897